# Patient Record
(demographics unavailable — no encounter records)

---

## 2017-11-14 NOTE — DIAGNOSTIC IMAGING REPORT
R KNEE 1 OR 2 VIEWS ROUTINE



CLINICAL HISTORY: 52 years-old Male presenting with M25.561 PAIN IN RIGHT KNEE. 



TECHNIQUE: Frontal and crosstable lateral views of the right knee were obtained.





COMPARISON: None.



FINDINGS:

No acute fracture or malalignment. No advanced degenerative change. No knee

joint effusion. No radiographic soft tissue abnormality.



IMPRESSION:

No acute osseous injury of the right knee.







Electronically signed by:  Stephane Loyd M.D.

11/14/2017 9:29 PM



Dictated Date/Time:  11/14/2017 9:28 PM